# Patient Record
Sex: MALE | URBAN - NONMETROPOLITAN AREA
[De-identification: names, ages, dates, MRNs, and addresses within clinical notes are randomized per-mention and may not be internally consistent; named-entity substitution may affect disease eponyms.]

---
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
## 2023-04-03 ENCOUNTER — HOSPITAL ENCOUNTER (EMERGENCY)
Facility: MEDICAL CENTER | Age: 23
End: 2023-04-03
Attending: EMERGENCY MEDICINE
Payer: COMMERCIAL

## 2023-04-03 ENCOUNTER — APPOINTMENT (OUTPATIENT)
Dept: RADIOLOGY | Facility: MEDICAL CENTER | Age: 23
End: 2023-04-03
Attending: EMERGENCY MEDICINE
Payer: COMMERCIAL

## 2023-04-03 VITALS
BODY MASS INDEX: 27.02 KG/M2 | WEIGHT: 210.54 LBS | RESPIRATION RATE: 15 BRPM | SYSTOLIC BLOOD PRESSURE: 143 MMHG | HEART RATE: 61 BPM | TEMPERATURE: 98.2 F | OXYGEN SATURATION: 95 % | DIASTOLIC BLOOD PRESSURE: 89 MMHG | HEIGHT: 74 IN

## 2023-04-03 DIAGNOSIS — S16.1XXA STRAIN OF NECK MUSCLE, INITIAL ENCOUNTER: ICD-10-CM

## 2023-04-03 PROCEDURE — 72040 X-RAY EXAM NECK SPINE 2-3 VW: CPT

## 2023-04-03 PROCEDURE — 72125 CT NECK SPINE W/O DYE: CPT

## 2023-04-03 PROCEDURE — 99284 EMERGENCY DEPT VISIT MOD MDM: CPT

## 2023-04-04 NOTE — DISCHARGE INSTRUCTIONS
Ice to painful area 20 minutes on, 20 minutes off as often as possible.  Tylenol and ibuprofen for pain.  Have your doctor recheck your blood pressure was quite elevated today.
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
## 2023-04-04 NOTE — ED NOTES
PIV removed, catheter intact, dressing applied.   DC home with written and verbal instructions regarding f/u, activity and reasons to return to ED. Verbalized understanding, no further questions, ambulated out of ED with a steady gait with all belongings in nad.

## 2024-08-07 ENCOUNTER — OFFICE VISIT (OUTPATIENT)
Dept: URGENT CARE | Facility: CLINIC | Age: 24
End: 2024-08-07
Payer: COMMERCIAL

## 2024-08-07 VITALS
HEIGHT: 74 IN | TEMPERATURE: 98.2 F | OXYGEN SATURATION: 96 % | HEART RATE: 60 BPM | BODY MASS INDEX: 21.6 KG/M2 | WEIGHT: 168.3 LBS | DIASTOLIC BLOOD PRESSURE: 98 MMHG | RESPIRATION RATE: 16 BRPM | SYSTOLIC BLOOD PRESSURE: 120 MMHG

## 2024-08-07 DIAGNOSIS — R07.89 CHEST PRESSURE: ICD-10-CM

## 2024-08-07 PROCEDURE — 99214 OFFICE O/P EST MOD 30 MIN: CPT | Performed by: REGISTERED NURSE

## 2024-08-07 PROCEDURE — 3074F SYST BP LT 130 MM HG: CPT | Performed by: REGISTERED NURSE

## 2024-08-07 PROCEDURE — 3080F DIAST BP >= 90 MM HG: CPT | Performed by: REGISTERED NURSE

## 2024-08-07 ASSESSMENT — ENCOUNTER SYMPTOMS
HEADACHES: 0
DIZZINESS: 0
SHORTNESS OF BREATH: 0
CHILLS: 0
PALPITATIONS: 0
VOMITING: 0
WEIGHT LOSS: 0
DIAPHORESIS: 0
FEVER: 0
ORTHOPNEA: 0

## 2024-08-08 NOTE — PROGRESS NOTES
"Subjective:   Dio Jean is a 23 y.o. male who presents for Chest Pressure ((L) side, feels tight, tingling in (L) arm )    HPI  Feels pressure but not pain in the left side of chest, it primarily happens when he is laying on left side but recently has had some episodes that are random, not activity or food related. No associated symptoms like shortness of breath dizziness.  The length of the symptoms vary from moments to hours.  He can help the symptoms by apply pressure to left chest. Reports his mother had open heart surgery in 20's for unknown issues, and his sister also had a heart defect. He is concerned he may have something underlying. No dx medical issues but has been told his blood pressure was elevated in the past.  Never been on medication for it.    Review of Systems   Constitutional:  Negative for chills, diaphoresis, fever, malaise/fatigue and weight loss.   Respiratory:  Negative for shortness of breath.    Cardiovascular:  Negative for chest pain, palpitations, orthopnea and leg swelling.   Gastrointestinal:  Negative for vomiting.   Skin:  Negative for rash.   Neurological:  Negative for dizziness and headaches.       No Known Allergies    There are no problems to display for this patient.      No current [facility]-ordered outpatient medications on file.     No current [facility]-ordered facility-administered medications on file.       Past Surgical History:   Procedure Laterality Date    MYRINGOTOMY Bilateral     multiple sets of tubes in ears for chronic ear infections       Social History     Tobacco Use    Smoking status: Never    Smokeless tobacco: Current     Types: Chew   Vaping Use    Vaping status: Former   Substance Use Topics    Alcohol use: Yes     Comment: \"a beer or two a night\"    Drug use: Yes     Types: Inhaled     Comment: marijuana occasionally       family history is not on file.     Problem list, medications, and allergies reviewed by myself today in Epic.     Objective:   BP (!) 120/98 " "(BP Location: Left arm, Patient Position: Sitting, BP Cuff Size: Adult long)   Pulse 60   Temp 36.8 °C (98.2 °F) (Temporal)   Resp 16   Ht 1.88 m (6' 2\")   Wt 76.3 kg (168 lb 4.8 oz)   SpO2 96%   BMI 21.61 kg/m²     Physical Exam  Vitals and nursing note reviewed.   Constitutional:       General: He is not in acute distress.     Appearance: Normal appearance. He is well-developed. He is not ill-appearing, toxic-appearing or diaphoretic.   HENT:      Head: Normocephalic and atraumatic.      Right Ear: Tympanic membrane, ear canal and external ear normal.      Left Ear: Tympanic membrane, ear canal and external ear normal.      Nose: Nose normal. No congestion or rhinorrhea.      Mouth/Throat:      Mouth: Mucous membranes are moist.      Pharynx: Oropharynx is clear. No oropharyngeal exudate or posterior oropharyngeal erythema.   Eyes:      General: No scleral icterus.     Conjunctiva/sclera: Conjunctivae normal.   Cardiovascular:      Rate and Rhythm: Normal rate and regular rhythm.      Pulses: Normal pulses.      Heart sounds: Normal heart sounds. No murmur heard.     No friction rub.   Pulmonary:      Effort: Pulmonary effort is normal. No respiratory distress.      Breath sounds: Normal breath sounds. No wheezing, rhonchi or rales.   Chest:      Chest wall: No tenderness.   Musculoskeletal:         General: No swelling or tenderness.      Cervical back: Normal range of motion and neck supple.      Right lower leg: No edema.      Left lower leg: No edema.   Lymphadenopathy:      Cervical: No cervical adenopathy.   Skin:     General: Skin is warm and dry.   Neurological:      General: No focal deficit present.      Mental Status: He is alert and oriented to person, place, and time.      Cranial Nerves: No cranial nerve deficit.   Psychiatric:         Mood and Affect: Mood normal.         Behavior: Behavior normal.         Thought Content: Thought content normal.         Judgment: Judgment normal.       ECG " today interpreted by myself  Sinus rhythm, RSR in V1 and V2 did show RSR in V1 and V2 likely normal variant given patient's age  LA: 176  QRSD: 105  QT: 455  Qtc: 415  ST Elevation: none  Axis: Normal axis      Assessment/Plan:     I personally reviewed prior external notes and test results pertinent to today's visit as well as additional imaging and testing completed in clinic today.    I introduced myself as Feliz Chao a Nurse Practitioner.    1. Chest pressure  EKG - Clinic Performed    Referral to establish with PCP    REFERRAL TO CARDIOLOGY        This is a very pleasant 23-year-old male presenting with left-sided chest pressure that been going on for up to 6 months, it is intermittent, no identifiable pattern other than it occurs when laying on his left side sometimes.  No associated symptoms like shortness of breath, chest pain, dizziness.  No trauma or injury.  He is afebrile good oxygenation and no tachycardia.  Did note an elevated diastolic pressure with in-clinic reading of 120/98.  His exam is very reassuring, neuro intact, no adventitious heart or lung sounds, no chest wall tenderness, no leg swelling.  I did not identify any red flag signs or symptoms.  He is concerned for underlying cardiac abnormality given his mother and sister's history although he has never had any diagnosed issues.  Did complete an EKG in clinic which shows no signs of ischemia, and health.  We did have a discussion about going to the emergency department but at this time he declines.  I will refer him to primary care as well as cardiology for consultation given his history.  The Patient was encouraged to monitor symptoms closely, and we reviewed the signs and symptoms that require a higher level of care through the emergency department. Patient verbalized understanding.    Please note that this dictation was created using voice recognition software. I have made every reasonable attempt to correct obvious errors, but I expect  that there are errors of grammar and possibly content that I did not discover before finalizing the note.    This note was electronically signed by ABBEY Renner

## 2024-09-16 ENCOUNTER — TELEPHONE (OUTPATIENT)
Dept: CARDIOLOGY | Facility: MEDICAL CENTER | Age: 24
End: 2024-09-16

## 2024-09-16 ENCOUNTER — OFFICE VISIT (OUTPATIENT)
Dept: MEDICAL GROUP | Facility: PHYSICIAN GROUP | Age: 24
End: 2024-09-16
Attending: REGISTERED NURSE
Payer: COMMERCIAL

## 2024-09-16 VITALS
HEIGHT: 74 IN | SYSTOLIC BLOOD PRESSURE: 124 MMHG | TEMPERATURE: 99 F | BODY MASS INDEX: 21.07 KG/M2 | OXYGEN SATURATION: 96 % | HEART RATE: 74 BPM | DIASTOLIC BLOOD PRESSURE: 76 MMHG | WEIGHT: 164.2 LBS

## 2024-09-16 DIAGNOSIS — Z11.3 SCREEN FOR SEXUALLY TRANSMITTED DISEASES: ICD-10-CM

## 2024-09-16 DIAGNOSIS — Z00.00 HEALTHCARE MAINTENANCE: ICD-10-CM

## 2024-09-16 DIAGNOSIS — H65.06 RECURRENT ACUTE SEROUS OTITIS MEDIA OF BOTH EARS: ICD-10-CM

## 2024-09-16 DIAGNOSIS — K21.9 GASTROESOPHAGEAL REFLUX DISEASE WITHOUT ESOPHAGITIS: ICD-10-CM

## 2024-09-16 DIAGNOSIS — Z11.59 NEED FOR HEPATITIS C SCREENING TEST: ICD-10-CM

## 2024-09-16 PROCEDURE — 3078F DIAST BP <80 MM HG: CPT | Performed by: FAMILY MEDICINE

## 2024-09-16 PROCEDURE — 99214 OFFICE O/P EST MOD 30 MIN: CPT | Performed by: FAMILY MEDICINE

## 2024-09-16 PROCEDURE — 3074F SYST BP LT 130 MM HG: CPT | Performed by: FAMILY MEDICINE

## 2024-09-16 RX ORDER — PANTOPRAZOLE SODIUM 40 MG/1
40 TABLET, DELAYED RELEASE ORAL DAILY
Qty: 30 TABLET | Refills: 0 | Status: SHIPPED | OUTPATIENT
Start: 2024-09-16

## 2024-09-16 RX ORDER — FAMOTIDINE 20 MG/1
20 TABLET, FILM COATED ORAL NIGHTLY
Qty: 30 TABLET | Refills: 0 | Status: SHIPPED | OUTPATIENT
Start: 2024-09-16

## 2024-09-16 ASSESSMENT — ENCOUNTER SYMPTOMS
VOMITING: 0
PALPITATIONS: 0
COUGH: 0
FEVER: 0
SHORTNESS OF BREATH: 0
SORE THROAT: 0
NAUSEA: 0
CHILLS: 0
ABDOMINAL PAIN: 0

## 2024-09-16 ASSESSMENT — PATIENT HEALTH QUESTIONNAIRE - PHQ9: CLINICAL INTERPRETATION OF PHQ2 SCORE: 0

## 2024-09-16 NOTE — TELEPHONE ENCOUNTER
Spoke to patient in regards to records for NP appointment with [facility].     Patient has seen a cardiologist before?  No       Any recent cardiac testing outside of [facility]?  No       Were any records requested?  No

## 2024-09-16 NOTE — PROGRESS NOTES
Verbal consent was acquired by the patient to use [employer] ambient listening note generation during this visit.    Subjective:     HPI:   History of Present Illness  The patient is a 24-year-old male who presents to [facility] care.    He has not seen his regular doctor for some time, having moved from [place] three years ago.     He is interested in hepatitis C and HIV antibody screening but does not wish to undergo STD testing.     He reports no symptoms of depression or mood changes.     His last tetanus shot was administered in 2010 and thus is due.    He is not on any regular medication.    He experienced acid reflux six months ago, which was somewhat alleviated by Prilosec. However, the symptoms have since returned and worsened. A few weeks ago, he visited the ER due to chest burning, fearing a heart attack. An EKG ruled out a heart attack, suggesting GERD as a possible cause. He experiences a sour taste in his mouth and notes that his symptoms worsen when lying down at night. Alcohol consumption seems to exacerbate his symptoms. He finds relief after meals by taking Tums. He experiences pain on the left side when lying in bed and does not report any epigastric pain.  Patient admits to acidic taste as well as chest burning of his chest.  Patient notes the symptoms are worse at night.  Patient has not identified specific inciting factors but does note spicy foods alcohol and citrus has made it worse.    He has a history of ear infections and has had tubes placed in his ears multiple times. He reports decreased hearing in his left ear, a condition he has had since childhood. He does not use Q-tips or lavage kits and has never been diagnosed with a ruptured eardrum. His last tube placement was when he was 14 or 15 years old. He occasionally gets ear wax but does not report any discharge.    SOCIAL HISTORY  He drinks alcohol. He chews tobacco. He smokes weed occasionally.    Review of Systems   Constitutional:   "Negative for chills and fever.   HENT:  Negative for congestion and sore throat.    Respiratory:  Negative for cough and shortness of breath.    Cardiovascular:  Negative for chest pain and palpitations.   Gastrointestinal:  Negative for abdominal pain, nausea and vomiting.       Health Maintenance: Completed    Objective:     Exam:  /76 (BP Location: Right arm, Patient Position: Sitting, BP Cuff Size: Adult)   Pulse 74   Temp 37.2 °C (99 °F) (Temporal)   Ht 1.88 m (6' 2\")   Wt 74.5 kg (164 lb 3.2 oz)   SpO2 96%   BMI 21.08 kg/m²  Body mass index is 21.08 kg/m².    Physical Exam  Vitals reviewed.   Constitutional:       Appearance: Normal appearance.   HENT:      Head: Normocephalic and atraumatic.      Right Ear: Tympanic membrane, ear canal and external ear normal.      Left Ear: External ear normal. There is impacted cerumen.      Nose: Nose normal.   Cardiovascular:      Rate and Rhythm: Normal rate and regular rhythm.      Pulses: Normal pulses.      Heart sounds: Normal heart sounds. No murmur heard.  Pulmonary:      Effort: Pulmonary effort is normal.      Breath sounds: Normal breath sounds.   Abdominal:      General: Abdomen is flat.      Palpations: Abdomen is soft.   Skin:     General: Skin is warm and dry.   Neurological:      Mental Status: He is alert.   Psychiatric:         Mood and Affect: Mood normal.         Behavior: Behavior normal.             Results  Testing  EKG was normal.    Assessment & Plan:     1. Healthcare maintenance  CBC WITHOUT DIFFERENTIAL    TSH WITH REFLEX TO FT4    HEMOGLOBIN A1C    Lipid Profile    Comp Metabolic Panel      2. Need for hepatitis C screening test  HEP C VIRUS ANTIBODY      3. Screen for sexually transmitted diseases  HIV AG/AB COMBO ASSAY SCREENING      4. Gastroesophageal reflux disease without esophagitis  pantoprazole (PROTONIX) 40 MG Tablet Delayed Response    famotidine (PEPCID) 20 MG Tab      5. Recurrent acute serous otitis media of both ears  " Referral to ENT          Assessment & Plan  1. Establishment of care.  Annual labs were recommended, including CBC, electrolytes, kidney function, liver function, thyroid, HbA1c, and cholesterol. Hepatitis C and HIV antibody screening were also suggested. Tdap, influenza, and COVID-19 vaccines were advised. Blood work will be conducted.    2. Gastroesophageal Reflux Disease (GERD).  A higher dose PPI was prescribed, to be taken once daily. Famotidine was also prescribed for nightly use. He was advised to maintain a food diary and reduce alcohol consumption.  Chewing tobacco cessation was advised.    3. Ear wax  Recurrent otitis media  An over-the-counter ear lavage kit was recommended. A referral to an ENT specialist was made.        Return in about 4 weeks (around 10/14/2024) for f/up gerd .    Please note that this dictation was created using voice recognition software. I have made every reasonable attempt to correct obvious errors, but I expect that there are errors of grammar and possibly content that I did not discover before finalizing the note.    Anamaria España MD  Family Medicine and Non - Operative Sports Medicine   [facility]

## 2024-09-26 ENCOUNTER — APPOINTMENT (OUTPATIENT)
Dept: CARDIOLOGY | Facility: MEDICAL CENTER | Age: 24
End: 2024-09-26
Attending: REGISTERED NURSE
Payer: COMMERCIAL

## 2024-10-01 ENCOUNTER — HOSPITAL ENCOUNTER (OUTPATIENT)
Dept: LAB | Facility: MEDICAL CENTER | Age: 24
End: 2024-10-01
Attending: FAMILY MEDICINE
Payer: COMMERCIAL

## 2024-10-01 DIAGNOSIS — Z00.00 HEALTHCARE MAINTENANCE: ICD-10-CM

## 2024-10-01 DIAGNOSIS — Z11.3 SCREEN FOR SEXUALLY TRANSMITTED DISEASES: ICD-10-CM

## 2024-10-01 DIAGNOSIS — Z11.59 NEED FOR HEPATITIS C SCREENING TEST: ICD-10-CM

## 2024-10-01 LAB
ALBUMIN SERPL BCP-MCNC: 4.4 G/DL (ref 3.2–4.9)
ALBUMIN/GLOB SERPL: 1.6 G/DL
ALP SERPL-CCNC: 59 U/L (ref 30–99)
ALT SERPL-CCNC: 18 U/L (ref 2–50)
ANION GAP SERPL CALC-SCNC: 8 MMOL/L (ref 7–16)
AST SERPL-CCNC: 18 U/L (ref 12–45)
BILIRUB SERPL-MCNC: 0.5 MG/DL (ref 0.1–1.5)
BUN SERPL-MCNC: 15 MG/DL (ref 8–22)
CALCIUM ALBUM COR SERPL-MCNC: 8.8 MG/DL (ref 8.5–10.5)
CALCIUM SERPL-MCNC: 9.1 MG/DL (ref 8.5–10.5)
CHLORIDE SERPL-SCNC: 103 MMOL/L (ref 96–112)
CHOLEST SERPL-MCNC: 173 MG/DL (ref 100–199)
CO2 SERPL-SCNC: 28 MMOL/L (ref 20–33)
CREAT SERPL-MCNC: 1.08 MG/DL (ref 0.5–1.4)
ERYTHROCYTE [DISTWIDTH] IN BLOOD BY AUTOMATED COUNT: 40.5 FL (ref 35.9–50)
EST. AVERAGE GLUCOSE BLD GHB EST-MCNC: 108 MG/DL
FASTING STATUS PATIENT QL REPORTED: NORMAL
GFR SERPLBLD CREATININE-BSD FMLA CKD-EPI: 98 ML/MIN/1.73 M 2
GLOBULIN SER CALC-MCNC: 2.8 G/DL (ref 1.9–3.5)
GLUCOSE SERPL-MCNC: 96 MG/DL (ref 65–99)
HBA1C MFR BLD: 5.4 % (ref 4–5.6)
HCT VFR BLD AUTO: 48.6 % (ref 42–52)
HCV AB SER QL: NONREACTIVE
HDLC SERPL-MCNC: 68 MG/DL
HGB BLD-MCNC: 16.7 G/DL (ref 14–18)
HIV 1+2 AB+HIV1 P24 AG SERPL QL IA: NORMAL
LDLC SERPL CALC-MCNC: 82 MG/DL
MCH RBC QN AUTO: 30.3 PG (ref 27–33)
MCHC RBC AUTO-ENTMCNC: 34.4 G/DL (ref 32.3–36.5)
MCV RBC AUTO: 88.2 FL (ref 81.4–97.8)
PLATELET # BLD AUTO: 238 K/UL (ref 164–446)
PMV BLD AUTO: 10 FL (ref 9–12.9)
POTASSIUM SERPL-SCNC: 4 MMOL/L (ref 3.6–5.5)
PROT SERPL-MCNC: 7.2 G/DL (ref 6–8.2)
RBC # BLD AUTO: 5.51 M/UL (ref 4.7–6.1)
SODIUM SERPL-SCNC: 139 MMOL/L (ref 135–145)
TRIGL SERPL-MCNC: 113 MG/DL (ref 0–149)
TSH SERPL DL<=0.005 MIU/L-ACNC: 2.67 UIU/ML (ref 0.38–5.33)
WBC # BLD AUTO: 5.7 K/UL (ref 4.8–10.8)

## 2024-10-01 PROCEDURE — 85027 COMPLETE CBC AUTOMATED: CPT

## 2024-10-01 PROCEDURE — 80061 LIPID PANEL: CPT

## 2024-10-01 PROCEDURE — 36415 COLL VENOUS BLD VENIPUNCTURE: CPT

## 2024-10-01 PROCEDURE — 87389 HIV-1 AG W/HIV-1&-2 AB AG IA: CPT

## 2024-10-01 PROCEDURE — 86803 HEPATITIS C AB TEST: CPT

## 2024-10-01 PROCEDURE — 83036 HEMOGLOBIN GLYCOSYLATED A1C: CPT

## 2024-10-01 PROCEDURE — 80053 COMPREHEN METABOLIC PANEL: CPT

## 2024-10-01 PROCEDURE — 84443 ASSAY THYROID STIM HORMONE: CPT

## 2024-10-13 DIAGNOSIS — K21.9 GASTROESOPHAGEAL REFLUX DISEASE WITHOUT ESOPHAGITIS: ICD-10-CM

## 2024-10-14 RX ORDER — FAMOTIDINE 20 MG/1
20 TABLET, FILM COATED ORAL NIGHTLY
Qty: 30 TABLET | Refills: 0 | Status: SHIPPED | OUTPATIENT
Start: 2024-10-14

## 2024-10-14 RX ORDER — PANTOPRAZOLE SODIUM 40 MG/1
40 TABLET, DELAYED RELEASE ORAL DAILY
Qty: 30 TABLET | Refills: 0 | Status: SHIPPED | OUTPATIENT
Start: 2024-10-14

## 2024-10-15 ENCOUNTER — APPOINTMENT (OUTPATIENT)
Dept: MEDICAL GROUP | Facility: PHYSICIAN GROUP | Age: 24
End: 2024-10-15
Payer: COMMERCIAL

## 2024-10-15 VITALS
SYSTOLIC BLOOD PRESSURE: 118 MMHG | WEIGHT: 167 LBS | TEMPERATURE: 97.5 F | HEIGHT: 74 IN | DIASTOLIC BLOOD PRESSURE: 76 MMHG | HEART RATE: 71 BPM | OXYGEN SATURATION: 98 % | BODY MASS INDEX: 21.43 KG/M2

## 2024-10-15 DIAGNOSIS — K21.9 GASTROESOPHAGEAL REFLUX DISEASE WITHOUT ESOPHAGITIS: ICD-10-CM

## 2024-10-15 DIAGNOSIS — S29.011A PECTORALIS MUSCLE STRAIN, INITIAL ENCOUNTER: ICD-10-CM

## 2024-10-15 PROCEDURE — 99213 OFFICE O/P EST LOW 20 MIN: CPT | Performed by: FAMILY MEDICINE

## 2024-10-15 PROCEDURE — 3078F DIAST BP <80 MM HG: CPT | Performed by: FAMILY MEDICINE

## 2024-10-15 PROCEDURE — 3074F SYST BP LT 130 MM HG: CPT | Performed by: FAMILY MEDICINE

## 2024-10-15 ASSESSMENT — ENCOUNTER SYMPTOMS
SORE THROAT: 0
VOMITING: 0
NAUSEA: 0
FEVER: 0
PALPITATIONS: 0
CHILLS: 0
ABDOMINAL PAIN: 0
COUGH: 0
SHORTNESS OF BREATH: 0

## 2024-10-15 ASSESSMENT — FIBROSIS 4 INDEX: FIB4 SCORE: 0.43

## 2025-01-23 ENCOUNTER — HOSPITAL ENCOUNTER (OUTPATIENT)
Facility: MEDICAL CENTER | Age: 25
End: 2025-01-23
Attending: OTOLARYNGOLOGY | Admitting: OTOLARYNGOLOGY
Payer: COMMERCIAL

## 2025-05-12 ENCOUNTER — OFFICE VISIT (OUTPATIENT)
Dept: URGENT CARE | Facility: CLINIC | Age: 25
End: 2025-05-12
Payer: COMMERCIAL

## 2025-05-12 ENCOUNTER — APPOINTMENT (OUTPATIENT)
Dept: URGENT CARE | Facility: CLINIC | Age: 25
End: 2025-05-12
Payer: COMMERCIAL

## 2025-05-12 ENCOUNTER — APPOINTMENT (OUTPATIENT)
Dept: RADIOLOGY | Facility: IMAGING CENTER | Age: 25
End: 2025-05-12
Attending: NURSE PRACTITIONER
Payer: COMMERCIAL

## 2025-05-12 VITALS
TEMPERATURE: 99.2 F | DIASTOLIC BLOOD PRESSURE: 84 MMHG | RESPIRATION RATE: 14 BRPM | SYSTOLIC BLOOD PRESSURE: 122 MMHG | OXYGEN SATURATION: 98 % | HEIGHT: 74 IN | WEIGHT: 197.5 LBS | BODY MASS INDEX: 25.35 KG/M2 | HEART RATE: 58 BPM

## 2025-05-12 DIAGNOSIS — S62.101A CLOSED FRACTURE OF SESAMOID BONE OF RIGHT HAND, INITIAL ENCOUNTER: ICD-10-CM

## 2025-05-12 DIAGNOSIS — S63.641A SPRAIN OF METACARPOPHALANGEAL (MCP) JOINT OF RIGHT THUMB, INITIAL ENCOUNTER: ICD-10-CM

## 2025-05-12 PROCEDURE — 73140 X-RAY EXAM OF FINGER(S): CPT | Mod: TC,RT | Performed by: RADIOLOGY

## 2025-05-12 PROCEDURE — 3074F SYST BP LT 130 MM HG: CPT | Performed by: NURSE PRACTITIONER

## 2025-05-12 PROCEDURE — 99213 OFFICE O/P EST LOW 20 MIN: CPT | Performed by: NURSE PRACTITIONER

## 2025-05-12 PROCEDURE — 3079F DIAST BP 80-89 MM HG: CPT | Performed by: NURSE PRACTITIONER

## 2025-05-12 ASSESSMENT — VISUAL ACUITY: OU: 1

## 2025-05-12 ASSESSMENT — FIBROSIS 4 INDEX: FIB4 SCORE: 0.43

## 2025-05-13 NOTE — Clinical Note
REFERRAL APPROVAL NOTICE         Sent on May 13, 2025                   Dio Jean  [address] [place] [id number]                   Dear Mr. Jean,    After a careful review of the medical information and benefit coverage, [facility] has processed your referral. See below for additional details.    If applicable, you must be actively enrolled with your insurance for coverage of the authorized service. If you have any questions regarding your coverage, please contact your insurance directly.    REFERRAL INFORMATION   Referral #:  [id number]  Referred-To Department    Referred-By Provider:  Hand Surgery    ABBEY Jenkins   [facility]      [address]  [address]  [facility] [id number] [phone] [address] [place] [id number] [phone]    Referral Start Date:  05/12/2025  Referral End Date:   05/12/2026             SCHEDULING  If you do not already have an appointment, please call [phone] to make an appointment.     MORE INFORMATION  If you do not already have a [facility] account, sign up at: [email or web address]  You can access your medical information, make appointments, see lab results, billing information, and more.  If you have questions regarding this referral, please contact  the [facility] Referrals department at:             [phone]. Monday - Friday 8:00AM - 5:00PM.     Sincerely,    [facility]

## 2025-05-13 NOTE — PROGRESS NOTES
"Subjective:     Dio Jean is a 24 y.o. male who presents for Joint Injection (Right thumb pain for a week)       Hand Injury  This is a new problem.     Ambient listening software ([employer]) used during this encounter with the consent of the patient. The following text is AI-assisted:    History of Present Illness  The patient presents with right thumb pain. He injured his thumb on 05/06/2025 while handling tools [direct blow to tip of thumb while extended]. Initially, he did not perceive the injury as significant, but later noticed persistent swelling and discomfort. Self-management with ice did not improve the condition. The thumb remains swollen and painful, especially when gripping objects or applying pressure. Tenderness and residual bruising are present. He has been taking ibuprofen for pain.    Denies work-related injury.    Review of Systems   All other systems reviewed and are negative.  Refer to HPI for additional details.    During this visit, appropriate PPE was worn, and hand hygiene was performed.    PMH:  has a past medical history of Chronic ear infection, bilateral.    MEDS:   Current Outpatient Medications:     pantoprazole (PROTONIX) 40 MG Tablet Delayed Response, TAKE ONE TABLET BY MOUTH ONE TIME DAILY, Disp: 30 Tablet, Rfl: 0    famotidine (PEPCID) 20 MG Tab, Take 1 Tablet by mouth every evening., Disp: 30 Tablet, Rfl: 0    ALLERGIES: No Known Allergies  SURGHX:   Past Surgical History:   Procedure Laterality Date    MYRINGOTOMY Bilateral     multiple sets of tubes in ears for chronic ear infections     SOCHX:  reports that he has never smoked. His smokeless tobacco use includes chew. He reports current alcohol use. He reports current drug use. Drug: Inhaled.    FH: Per HPI as applicable/pertinent.      Objective:     /84 (BP Location: Left arm, Patient Position: Sitting, BP Cuff Size: Adult)   Pulse (!) 58   Temp 37.3 °C (99.2 °F) (Temporal)   Resp 14   Ht 1.88 m (6' 2\")   " Wt 89.6 kg (197 lb 8 oz)   SpO2 98%   BMI 25.36 kg/m²     Physical Exam  Nursing note reviewed.   Constitutional:       General: He is not in acute distress.     Appearance: He is well-developed. He is not ill-appearing or toxic-appearing.   Eyes:      General: Vision grossly intact.   Cardiovascular:      Rate and Rhythm: Normal rate.   Pulmonary:      Effort: Pulmonary effort is normal. No respiratory distress.   Musculoskeletal:         General: No deformity. Normal range of motion.      Right wrist: Normal pulse.      Right hand: Swelling (Mild, right 1st MCPJ, with residual ecchymosis) and tenderness (Right 1st MCPJ) present. No deformity or lacerations. Normal range of motion. Normal strength. Normal sensation. Normal capillary refill.   Skin:     General: Skin is warm and dry.      Coloration: Skin is not pale.   Neurological:      Mental Status: He is alert and oriented to person, place, and time.      Motor: No weakness.   Psychiatric:         Behavior: Behavior normal. Behavior is cooperative.     XR R Thumb:    Details    Reading Physician Reading Date Result Priority   Sterling Marshall M.D.  [phone]     5/12/2025      Narrative & Impression     5/12/2025 6:11 PM     HISTORY/REASON FOR EXAM:  Pain/Deformity Following Trauma.  Injury.     TECHNIQUE/EXAM DESCRIPTION AND NUMBER OF VIEWS:  3 views of the RIGHT fingers.     COMPARISON: None     FINDINGS:  On the oblique view the sesamoid bone about the first MCP appears irregular and fractured.  No other fracture or dislocation.  The joint spaces are grossly preserved.  Bone mineralization is age-appropriate..    IMPRESSION:     Sesamoid bone fracture.        Exam Ended: 05/12/25  6:15 PM Last Resulted: 05/12/25  6:28 PM         Radiology report and images reviewed by myself.      Assessment/Plan:     1. Closed fracture of sesamoid bone of right hand, initial encounter  - Referral to Hand Surgery    2. Sprain of metacarpophalangeal (MCP) joint of right  thumb, initial encounter  - DX-FINGER(S) 2+ RIGHT; Future    Referred to hand surgery.    Recommend thumb spica splint. Applied.    Continue RICE, OTC ibuprofen.    Monitor. Warning signs reviewed. Return precautions advised.    Discharge summary provided.    Differential diagnosis, natural history, supportive care, over-the-counter symptom management per 's instructions, close monitoring, and indications for immediate follow-up discussed.     All questions answered. Patient agrees with the plan of care.

## 2025-05-13 NOTE — PATIENT INSTRUCTIONS
Details    Reading Physician Reading Date Result Priority   Sterling Marshall M.D.  [phone]     5/12/2025      Narrative & Impression     5/12/2025 6:11 PM     HISTORY/REASON FOR EXAM:  Pain/Deformity Following Trauma.  Injury.     TECHNIQUE/EXAM DESCRIPTION AND NUMBER OF VIEWS:  3 views of the RIGHT fingers.     COMPARISON: None     FINDINGS:  On the oblique view the sesamoid bone about the first MCP appears irregular and fractured.  No other fracture or dislocation.  The joint spaces are grossly preserved.  Bone mineralization is age-appropriate..        IMPRESSION:     Sesamoid bone fracture.        Exam Ended: 05/12/25  6:15 PM Last Resulted: 05/12/25  6:28 PM

## 2025-07-22 ENCOUNTER — APPOINTMENT (OUTPATIENT)
Dept: MEDICAL GROUP | Facility: PHYSICIAN GROUP | Age: 25
End: 2025-07-22
Payer: COMMERCIAL

## 2025-07-28 ENCOUNTER — OFFICE VISIT (OUTPATIENT)
Dept: MEDICAL GROUP | Facility: PHYSICIAN GROUP | Age: 25
End: 2025-07-28
Payer: COMMERCIAL

## 2025-07-28 VITALS
BODY MASS INDEX: 23.13 KG/M2 | RESPIRATION RATE: 14 BRPM | DIASTOLIC BLOOD PRESSURE: 78 MMHG | TEMPERATURE: 99.9 F | WEIGHT: 180.2 LBS | HEART RATE: 62 BPM | HEIGHT: 74 IN | SYSTOLIC BLOOD PRESSURE: 120 MMHG | OXYGEN SATURATION: 99 %

## 2025-07-28 DIAGNOSIS — R07.9 CHEST PAIN, UNSPECIFIED TYPE: ICD-10-CM

## 2025-07-28 DIAGNOSIS — R05.8 POST-VIRAL COUGH SYNDROME: Primary | ICD-10-CM

## 2025-07-28 DIAGNOSIS — K21.9 GASTROESOPHAGEAL REFLUX DISEASE WITHOUT ESOPHAGITIS: ICD-10-CM

## 2025-07-28 DIAGNOSIS — R09.82 POST-NASAL DRIP: ICD-10-CM

## 2025-07-28 PROCEDURE — 3078F DIAST BP <80 MM HG: CPT | Performed by: FAMILY MEDICINE

## 2025-07-28 PROCEDURE — 3074F SYST BP LT 130 MM HG: CPT | Performed by: FAMILY MEDICINE

## 2025-07-28 PROCEDURE — 99213 OFFICE O/P EST LOW 20 MIN: CPT | Performed by: FAMILY MEDICINE

## 2025-07-28 RX ORDER — FAMOTIDINE 20 MG/1
20 TABLET, FILM COATED ORAL NIGHTLY
Qty: 30 TABLET | Refills: 0 | Status: SHIPPED | OUTPATIENT
Start: 2025-07-28

## 2025-07-28 RX ORDER — LORATADINE 10 MG/1
10 TABLET ORAL DAILY
COMMUNITY

## 2025-07-28 RX ORDER — FLUTICASONE PROPIONATE 50 MCG
1 SPRAY, SUSPENSION (ML) NASAL DAILY
COMMUNITY

## 2025-07-28 ASSESSMENT — PATIENT HEALTH QUESTIONNAIRE - PHQ9: CLINICAL INTERPRETATION OF PHQ2 SCORE: 0

## 2025-07-28 ASSESSMENT — FIBROSIS 4 INDEX: FIB4 SCORE: 0.43

## 2025-07-28 NOTE — PROGRESS NOTES
Verbal consent was acquired by the patient to use [employer] ambient listening note generation during this visit.    Subjective:     HPI:   History of Present Illness  The patient presents for evaluation of cough, chest pain, and gastroesophageal reflux disease (GERD).    The patient reports awakening one morning with sputum production in his chest, which has persisted each morning since onset. He continues to experience nasal congestion and a cough that commenced approximately one month ago. Initially, he attributed these symptoms to his smoking habit; however, cessation of smoking has not resulted in symptom improvement. He denies dyspnea but reports pharyngeal congestion. The patient has a history of seasonal allergic rhinitis, typically not occurring at this time of year. He has previously utilized loratadine (Claritin) and nasal spray for allergy management. His cough is productive of clear, white sputum, without purulence. He has not taken any decongestants but has employed a neti pot for nasal irrigation. He did not seek medical attention during the illness and denies pyrexia or chills.    The patient also reports occasional arm pain and palpitations, which he speculates may be related to his GERD. He experienced chest pain during physical exertion last week, described as pressure localized to the left side of his chest. He denies arrhythmias but has noted palpitations when at rest and contemplating the sensation. An electrocardiogram (EKG) was performed last year due to his GERD, and he was referred to a cardiologist; however, he did not attend the appointment as his chest pain resolved following initiation of GERD medication.    The patient suspects his chest pain may be associated with his GERD. He discontinued his GERD medication after quitting chewing tobacco and is considering resuming it. He requests a refill of famotidine.    Social History:  Tobacco: The patient has quit smoking and chewing  "tobacco.    FAMILY HISTORY  The patient's mother had open heart surgery.    Health Maintenance: Completed    Objective:     Exam:  /78 (BP Location: Right arm, Patient Position: Sitting, BP Cuff Size: Adult)   Pulse 62   Temp 37.7 °C (99.9 °F) (Temporal)   Resp 14   Ht 1.88 m (6' 2\")   Wt 81.7 kg (180 lb 3.2 oz)   SpO2 99%   BMI 23.14 kg/m²  Body mass index is 23.14 kg/m².    Physical Exam  Vitals reviewed.   Constitutional:       Appearance: Normal appearance.   HENT:      Head: Normocephalic and atraumatic.      Nose: Nose normal.   Cardiovascular:      Rate and Rhythm: Normal rate and regular rhythm.      Pulses: Normal pulses.      Heart sounds: Normal heart sounds. No murmur heard.  Pulmonary:      Effort: Pulmonary effort is normal.      Breath sounds: Normal breath sounds.   Abdominal:      General: Abdomen is flat.      Palpations: Abdomen is soft.   Skin:     General: Skin is warm and dry.   Neurological:      Mental Status: He is alert.   Psychiatric:         Mood and Affect: Mood normal.         Behavior: Behavior normal.             Results  Diagnostic Testing   - EKG: Showed some right ventricular hypertrophy    Assessment & Plan:     1. Post-viral cough syndrome        2. Post-nasal drip        3. Chest pain, unspecified type  EKG - Clinic Performed      4. Gastroesophageal reflux disease without esophagitis  famotidine (PEPCID) 20 MG Tab          Assessment & Plan  1. Cough.  - The cough is likely postviral in nature, with allergies potentially contributing to postnasal drip.  - The absence of yellow or green mucus suggests no bacterial infection.  - Advised to resume Claritin and use Flonase nasal spray. An expectorant was recommended over a cough suppressant.  - A nasal rinse was suggested to clear irritants from the sinuses.    2. Chest pain.  - The chest pain could be due to GERD, anxiety, or a postviral cough. His risk for acute coronary syndrome is low given his age of 24.  - His " EKG from last year showed right ventricular hypertrophy, which could be contributing to the pain. His vital signs today are within normal limits, except for a slightly elevated temperature of 99.9, which decreased to 97.6 upon recheck.  - His radial pulse is strong and regular, and there are no murmurs or crackles in his lungs to suggest pneumonia. An EKG will be performed today to check for any new concerns or changes in his right ventricular hypertrophy.  - Advised to take ibuprofen as needed for pain relief.    3. Gastroesophageal reflux disease (GERD).  - Advised to avoid caffeine, nicotine, alcohol, spicy foods, tomato-based foods, and citrus fruits.  - Instructed to take famotidine once daily at night.  - A refill of famotidine was provided.        Return if symptoms worsen or fail to improve.    Please note that this dictation was created using voice recognition software. I have made every reasonable attempt to correct obvious errors, but I expect that there are errors of grammar and possibly content that I did not discover before finalizing the note.    Anamaria España MD  Family Medicine and Non - Operative Sports Medicine   [facility]

## 2025-08-01 ENCOUNTER — OFFICE VISIT (OUTPATIENT)
Facility: MEDICAL CENTER | Age: 25
End: 2025-08-01
Attending: INTERNAL MEDICINE
Payer: COMMERCIAL

## 2025-08-01 VITALS
RESPIRATION RATE: 16 BRPM | DIASTOLIC BLOOD PRESSURE: 80 MMHG | WEIGHT: 179 LBS | HEART RATE: 55 BPM | BODY MASS INDEX: 22.97 KG/M2 | SYSTOLIC BLOOD PRESSURE: 118 MMHG | HEIGHT: 74 IN | OXYGEN SATURATION: 96 %

## 2025-08-01 DIAGNOSIS — R07.89 CHEST PRESSURE: Primary | ICD-10-CM

## 2025-08-01 DIAGNOSIS — Z82.79 FAMILY HISTORY OF CONGENITAL HEART DEFECT: ICD-10-CM

## 2025-08-01 DIAGNOSIS — K21.9 GASTROESOPHAGEAL REFLUX DISEASE, UNSPECIFIED WHETHER ESOPHAGITIS PRESENT: ICD-10-CM

## 2025-08-01 LAB — EKG IMPRESSION: NORMAL

## 2025-08-01 PROCEDURE — 99214 OFFICE O/P EST MOD 30 MIN: CPT | Performed by: INTERNAL MEDICINE

## 2025-08-01 PROCEDURE — 3079F DIAST BP 80-89 MM HG: CPT | Performed by: INTERNAL MEDICINE

## 2025-08-01 PROCEDURE — 99204 OFFICE O/P NEW MOD 45 MIN: CPT | Performed by: INTERNAL MEDICINE

## 2025-08-01 PROCEDURE — 93005 ELECTROCARDIOGRAM TRACING: CPT | Mod: TC | Performed by: INTERNAL MEDICINE

## 2025-08-01 PROCEDURE — 93010 ELECTROCARDIOGRAM REPORT: CPT | Performed by: INTERNAL MEDICINE

## 2025-08-01 PROCEDURE — 3074F SYST BP LT 130 MM HG: CPT | Performed by: INTERNAL MEDICINE

## 2025-08-01 ASSESSMENT — FIBROSIS 4 INDEX: FIB4 SCORE: 0.43

## 2025-08-14 ENCOUNTER — HOSPITAL ENCOUNTER (OUTPATIENT)
Dept: RADIOLOGY | Facility: MEDICAL CENTER | Age: 25
End: 2025-08-14
Attending: INTERNAL MEDICINE
Payer: COMMERCIAL

## 2025-08-14 DIAGNOSIS — R07.89 CHEST PRESSURE: ICD-10-CM

## 2025-08-14 PROCEDURE — 93017 CV STRESS TEST TRACING ONLY: CPT | Mod: TC

## 2025-08-15 PROCEDURE — 93018 CV STRESS TEST I&R ONLY: CPT | Performed by: INTERNAL MEDICINE

## 2025-08-19 ENCOUNTER — RESULTS FOLLOW-UP (OUTPATIENT)
Dept: CARDIOLOGY | Facility: MEDICAL CENTER | Age: 25
End: 2025-08-19
Payer: COMMERCIAL

## 2025-08-22 ENCOUNTER — ANCILLARY PROCEDURE (OUTPATIENT)
Facility: MEDICAL CENTER | Age: 25
End: 2025-08-22
Attending: INTERNAL MEDICINE
Payer: COMMERCIAL

## 2025-08-22 DIAGNOSIS — Z82.79 FAMILY HISTORY OF CONGENITAL HEART DEFECT: ICD-10-CM

## 2025-08-22 DIAGNOSIS — R07.89 CHEST PRESSURE: ICD-10-CM

## 2025-08-22 PROCEDURE — 93306 TTE W/DOPPLER COMPLETE: CPT

## 2025-08-26 LAB
LV EJECT FRACT  99904: 66
LV EJECT FRACT MOD 2C 99903: 68.26
LV EJECT FRACT MOD 4C 99902: 64.52
LV EJECT FRACT MOD BP 99901: 66.23

## 2025-08-26 PROCEDURE — 93306 TTE W/DOPPLER COMPLETE: CPT | Mod: 26 | Performed by: INTERNAL MEDICINE
